# Patient Record
Sex: FEMALE | Race: WHITE | NOT HISPANIC OR LATINO | URBAN - METROPOLITAN AREA
[De-identification: names, ages, dates, MRNs, and addresses within clinical notes are randomized per-mention and may not be internally consistent; named-entity substitution may affect disease eponyms.]

---

## 2017-12-08 ENCOUNTER — APPOINTMENT (OUTPATIENT)
Dept: WOMENS IMAGING | Facility: HOSPITAL | Age: 27
End: 2017-12-08

## 2017-12-08 PROCEDURE — 76641 ULTRASOUND BREAST COMPLETE: CPT | Performed by: RADIOLOGY

## 2021-03-02 ENCOUNTER — HOSPITAL ENCOUNTER (EMERGENCY)
Facility: HOSPITAL | Age: 31
Discharge: HOME | End: 2021-03-02
Attending: EMERGENCY MEDICINE | Admitting: EMERGENCY MEDICINE

## 2021-03-02 VITALS
RESPIRATION RATE: 16 BRPM | OXYGEN SATURATION: 100 % | HEART RATE: 105 BPM | SYSTOLIC BLOOD PRESSURE: 129 MMHG | BODY MASS INDEX: 20.44 KG/M2 | TEMPERATURE: 97.5 F | WEIGHT: 138 LBS | HEIGHT: 69 IN | DIASTOLIC BLOOD PRESSURE: 86 MMHG

## 2021-03-02 DIAGNOSIS — F32.A DEPRESSION, UNSPECIFIED DEPRESSION TYPE: Primary | ICD-10-CM

## 2021-03-02 DIAGNOSIS — Z72.89 DELIBERATE SELF-CUTTING: ICD-10-CM

## 2021-03-02 LAB
AMPHET+METHAMPHET UR QL: NEGATIVE
BARBITURATES UR QL SCN: NEGATIVE
BENZODIAZ UR QL SCN: NEGATIVE
CANNABINOIDS SERPL QL: POSITIVE
COCAINE UR QL: NEGATIVE
ETHANOL BLD-MCNC: <10 MG/DL (ref 0–10)
ETHANOL UR QL: <0.01 %
HCG SERPL QL: NEGATIVE
METHADONE UR QL SCN: NEGATIVE
OPIATES UR QL: NEGATIVE
OXYCODONE UR QL SCN: NEGATIVE

## 2021-03-02 PROCEDURE — 80307 DRUG TEST PRSMV CHEM ANLYZR: CPT | Performed by: EMERGENCY MEDICINE

## 2021-03-02 PROCEDURE — 99283 EMERGENCY DEPT VISIT LOW MDM: CPT

## 2021-03-02 PROCEDURE — 82077 ASSAY SPEC XCP UR&BREATH IA: CPT | Performed by: EMERGENCY MEDICINE

## 2021-03-02 PROCEDURE — 90791 PSYCH DIAGNOSTIC EVALUATION: CPT

## 2021-03-02 PROCEDURE — 84703 CHORIONIC GONADOTROPIN ASSAY: CPT | Performed by: EMERGENCY MEDICINE

## 2021-03-02 NOTE — ED TRIAGE NOTES
Pt hsa been sent by psych to be evaluated.  She is not currently suicidal but has been in the last couple days    Patient was placed in face mask during first look triage.  Patient was wearing a face mask throughout encounter.  I wore personal protective equipment throughout the encounter.  Hand hygiene was performed before and after patient encounter.

## 2021-03-02 NOTE — ED PROVIDER NOTES
EMERGENCY DEPARTMENT ENCOUNTER    Room Number:  09/09  Date of encounter:  3/2/2021  PCP: Christo Conrad MD  Historian: Patient      HPI:  Chief Complaint: Suicidal thoughts      Context: Gerardo Hearn is a 30 y.o. female who presents to the ED c/o suicidal thoughts over the last several days.  She reports she is feeling better today without suicidal thoughts today.  She reports she saw her therapist today and relayed her feelings over the last several days and they encouraged her to come here.  She reports that she cut herself in her left arm in an attempt to harm herself yesterday.  She reports she has a history of similar behavior many years ago.  She has not done anything else to harm himself today.  She denies any overdose.  She reports she has been taking her medication as prescribed.  She denies other pain or injury.  She reports she has required inpatient hospitalization in the past for her psychiatric treatment.    Prior record review: No prior visits in the computer system.    PAST MEDICAL HISTORY  Active Ambulatory Problems     Diagnosis Date Noted   • No Active Ambulatory Problems     Resolved Ambulatory Problems     Diagnosis Date Noted   • No Resolved Ambulatory Problems     No Additional Past Medical History         PAST SURGICAL HISTORY  No past surgical history on file.      FAMILY HISTORY  No family history on file.      SOCIAL HISTORY  Social History     Socioeconomic History   • Marital status: Single     Spouse name: Not on file   • Number of children: Not on file   • Years of education: Not on file   • Highest education level: Not on file         ALLERGIES  Patient has no known allergies.        REVIEW OF SYSTEMS  Review of Systems   No chest pain, no shortness of breath, no abdominal pain, no weakness, no numbness  All systems reviewed and negative except for those discussed in HPI.       PHYSICAL EXAM    I have reviewed the triage vital signs and nursing notes.    ED  Triage Vitals   Temp Heart Rate Resp BP SpO2   03/02/21 1542 03/02/21 1542 03/02/21 1542 03/02/21 1611 03/02/21 1542   97.5 °F (36.4 °C) (!) 131 16 129/86 100 %      Temp src Heart Rate Source Patient Position BP Location FiO2 (%)   03/02/21 1542 03/02/21 1542 -- -- --   Tympanic Monitor          Physical Exam  GENERAL: Anxious, awake, alert, no acute distress  SKIN: Warm, dry  HENT: Normocephalic, atraumatic  EYES: no scleral icterus  CV: regular rhythm, regular rate  RESPIRATORY: normal effort, lungs clear  ABDOMEN: soft, non-tender, non-distended  MUSCULOSKELETAL: no deformity, left forearm with numerous linear wounds without deep laceration.  No active bleeding.  Sensation and motor intact.  NEURO: alert, moves all extremities, follows commands          LAB RESULTS  Recent Results (from the past 24 hour(s))   Ethanol    Collection Time: 03/02/21  4:10 PM    Specimen: Blood   Result Value Ref Range    Ethanol <10 0 - 10 mg/dL    Ethanol % <0.010 %   hCG, Serum, Qualitative    Collection Time: 03/02/21  4:11 PM    Specimen: Blood   Result Value Ref Range    HCG Qualitative Negative Negative   Urine Drug Screen - Urine, Clean Catch    Collection Time: 03/02/21  4:18 PM    Specimen: Urine, Clean Catch   Result Value Ref Range    Amphet/Methamphet, Screen Negative Negative    Barbiturates Screen, Urine Negative Negative    Benzodiazepine Screen, Urine Negative Negative    Cocaine Screen, Urine Negative Negative    Opiate Screen Negative Negative    THC, Screen, Urine Positive (A) Negative    Methadone Screen, Urine Negative Negative    Oxycodone Screen, Urine Negative Negative       Ordered the above labs and independently reviewed the results.        RADIOLOGY  No Radiology Exams Resulted Within Past 24 Hours    I ordered the above noted radiological studies. Reviewed by me and discussed with radiologist.  See dictation for official radiology interpretation.      PROCEDURES    Procedures      MEDICATIONS GIVEN IN  ER    Medications - No data to display      PROGRESS, DATA ANALYSIS, CONSULTS, AND MEDICAL DECISION MAKING    All labs have been independently reviewed by me.  All radiology studies have been reviewed by me and discussed with radiologist dictating the report.   EKG's independently viewed and interpreted by me.  Discussion below represents my analysis of pertinent findings related to patient's condition, differential diagnosis, treatment plan and final disposition.        ED Course as of Mar 02 1737   Tue Mar 02, 2021   1654 THC Screen, Urine(!): Positive [TR]   1654 HCG Qualitative: Negative [TR]   1654 Ethanol: <10 [TR]   1734 Psychiatry has evaluated patient.  She is denying suicidal ideation.  They feel she is safe for discharge. They report she would be a good candidate for intensive outpatient program however her insurance is not accepted here.  It would be accepted at the [facility]. The patient has been made aware of this and plans on going there after leaving here.    [TR]      ED Course User Index  [TR] Onur Coelho MD           PPE: Both the patient and I wore a surgical mask throughout the entire patient encounter. I wore protective goggles.       AS OF 17:37 EST VITALS:    BP - 129/86  HR - (!) 131  TEMP - 97.5 °F (36.4 °C) (Tympanic)  O2 SATS - 100%        DIAGNOSIS  Final diagnoses:   Depression, unspecified depression type   Deliberate self-cutting         DISPOSITION  Discharged home           Onur Coelho MD  03/02/21 [id number]
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
## 2021-04-16 ENCOUNTER — BULK ORDERING (OUTPATIENT)
Dept: CASE MANAGEMENT | Facility: OTHER | Age: 31
End: 2021-04-16

## 2021-04-16 DIAGNOSIS — Z23 IMMUNIZATION DUE: ICD-10-CM
